# Patient Record
Sex: MALE | Employment: UNEMPLOYED | URBAN - METROPOLITAN AREA
[De-identification: names, ages, dates, MRNs, and addresses within clinical notes are randomized per-mention and may not be internally consistent; named-entity substitution may affect disease eponyms.]

---

## 2021-04-07 ENCOUNTER — OFFICE VISIT (OUTPATIENT)
Dept: NEUROLOGY | Facility: CLINIC | Age: 41
End: 2021-04-07
Payer: MEDICAID

## 2021-04-07 VITALS
DIASTOLIC BLOOD PRESSURE: 79 MMHG | RESPIRATION RATE: 16 BRPM | SYSTOLIC BLOOD PRESSURE: 128 MMHG | OXYGEN SATURATION: 96 % | HEART RATE: 96 BPM

## 2021-04-07 DIAGNOSIS — Z75.8 TELEPHONE LANGUAGE INTERPRETER SERVICE REQUIRED: ICD-10-CM

## 2021-04-07 DIAGNOSIS — H49.40 TOLOSA-HUNT SYNDROME: Primary | ICD-10-CM

## 2021-04-07 DIAGNOSIS — Z22.7 TB LUNG, LATENT: ICD-10-CM

## 2021-04-07 DIAGNOSIS — U07.1 2019 NOVEL CORONAVIRUS DISEASE (COVID-19): ICD-10-CM

## 2021-04-07 DIAGNOSIS — G51.0 LEFT-SIDED BELL'S PALSY: ICD-10-CM

## 2021-04-07 PROCEDURE — 99204 OFFICE O/P NEW MOD 45 MIN: CPT | Mod: GC | Performed by: PSYCHIATRY & NEUROLOGY

## 2021-04-07 RX ORDER — SULFAMETHOXAZOLE AND TRIMETHOPRIM 400; 80 MG/1; MG/1
1 TABLET ORAL
COMMUNITY
Start: 2021-04-03

## 2021-04-07 RX ORDER — PREDNISONE 10 MG/1
TABLET ORAL
COMMUNITY
Start: 2021-04-04 | End: 2021-05-09

## 2021-04-07 RX ORDER — PANTOPRAZOLE SODIUM 40 MG/1
40 TABLET, DELAYED RELEASE ORAL
COMMUNITY
Start: 2021-04-04

## 2021-04-07 RX ORDER — OXYCODONE HYDROCHLORIDE 5 MG/1
5 TABLET ORAL
COMMUNITY
Start: 2021-04-03 | End: 2021-04-19

## 2021-04-07 RX ORDER — ONDANSETRON 4 MG/1
4 TABLET, FILM COATED ORAL
COMMUNITY
Start: 2021-04-03

## 2021-04-07 RX ORDER — PYRIDOXINE HCL (VITAMIN B6) 25 MG
25 TABLET ORAL
COMMUNITY
Start: 2021-04-04

## 2021-04-07 RX ORDER — ACETAMINOPHEN 500 MG
500 TABLET ORAL
COMMUNITY
Start: 2021-04-03

## 2021-04-07 RX ORDER — ISONIAZID 300 MG/1
300 TABLET ORAL
COMMUNITY
Start: 2021-04-04

## 2021-04-07 ASSESSMENT — PAIN SCALES - GENERAL: PAINLEVEL: NO PAIN (0)

## 2021-04-07 NOTE — PROGRESS NOTES
DEPARTMENT OF NEUROLOGY    Patient Name:  Luis Eduardo Carcamo  MRN:  [id number]    :  1980  Date of Clinic Visit:  2021  Primary Care Provider:  No primary care provider on file.        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    Pt [place] at [facility] [place]21 with 1wk HA, night sweats/chills, fatigue, vertigo, nausea, blurred and double vision, and 1d of increased L facial weakness. Fount to be COVID+ and exhibiting CN 3, 4, 6, and 7 palsies on exam. Underwent Brain MRI revealing contrast-enhancement of the L cavernous sinus and superior orbital fissure similar to that seen when originally diagnosed with Aniket-Osullivan in . At that time, he had also presented with similar symptoms, sans the L facial palsy. Was placed on high-dose IV steroids during his 3/20 - 3/24/2021 admission and then discharged on Prednisone 60mg daily, planning to take for 2wks w/ subsequent Neurology follow-up to decide on taper. At time of discharge, was also given Rifampin for his latent TB infection, which had been noted previously but appears to have never been treated.     Things were going well until 3/28 when the pt's symptoms again became severe (had never fully dissipated, only slightly improved during hospitalization) prompting a return to [facility]. On further investigation, was though that pt's worsening symptoms were due to the addition of Rifampin, which acts as an inducer on multiple CYP enzymes and leads to increased plasma clearance/decreased bioavailability of steroids thereby reducing their efficacy by a significant amount. As such, this was NOT felt to be a true relapse, but instead as if steroids had never been given.  "Switched Rifampin to Isoniazid/B6 and again gave high-dose steroids while hospitalized followed by discharge on Prednisone 60mg daily, again to be seen by Neurology who would decide on taper based off clinical improvement. Bactrim also prescribed for PCP prophylaxis    Since discharge from this most recent hospitalization, pt reports that he feels much better. His HA and nausea have completely disappeared - though he continues to take Tylenol and Zofran on a daily basis as he is wary of their return - and the L eye blurry/double vision and L facial weakness have both improved drastically. The pt reports he feels 75-80% back to normal.     Believes he is tolerating his medications well, saying only that he has developed a \"bad taste\" in his mouth, making his tongue feel dirty. As such, he has been chewing gum/brushing his teeth a lot more. Believes this \"bad taste\" may have arisen around the time of his first hospitalization when he was diagnosed w/ COVID. In addition to this, also notes a \"pounding sound\" - that is NOT painful - in his brain when he lies down to sleep. Has tried changing position, but this doesn't change things. Will dissipate if he sits up. Does not keep him from sleeping. Recalls have a similar experience when placed on steroids for his Aniket-Hunt in 2007. Denies HA, nausea/vomiting, vision/hearing changes, brain fog, or confusion    Vital signs:      BP: 128/79 Pulse: 96   Resp: 16 SpO2: 96 %          There is no height or weight on file to calculate BMI.  /79   Pulse 96   Resp 16   SpO2 96%       Examination:     -General: Sitting comfortably on the chair. No acute distress    -HEENT: Normocephalic. PERRL. No skin discolorations noted    -Cardio: RRR without murmurs or bruits     -Pulmonary: Symmetric chest rise, no increased work of breathing on RA    -Abdomen: Positive bowel sounds, soft, non-tender, no organomegaly    -Neurological:     --MS: Patient is alert, attentive, and " oriented. Speech is clear and fluid. Names normally. Registration, recall 3/3 and remote memory intact. Mental math normal.     --CNs: Visual fields are full to confrontation. Pupils are briskly reactive to light. Visual fields full w/ slight retention of double vision w/ extreme L lateral gaze. EOMI w/o further evidence of previously seen CN 3/4/6 palsies. Slight multidirectional nystagmus, facial sensation intact, muscles of mastication normal. Improved but still present complete L facial palsy (pt and wife affirm has chronic L Kwong's palsy at baseline and that his facial weakness is almost back to normal), hearing intact to conversation, palate elevation normal, sternomastoid and trapezius function normal, tongue motions normal     --Motor: Normal muscle tone and bulk. 5/5 muscle strength bilaterally save for 4/5 strength w/ L hip flexion. Still, could raise self to stand w/o use of his arms. Able to stand on toes/heels w/o issue    --Reflexes: 2+ reflexes at knees and biceps and ankles. Down-going toes bilaterally. No clonus    --Sensory: Light touch, vibration and PP intact bilaterally in upper and lower extremities. Neg pronator drift     --Coordination: Heel-shin and finger-nose-finger is intact. Neg Romberg.     --Gait: Stands with feet normally spaced. Gait is steady. Intact tandem walk      INVESTIGATIONS:  All available and relevant labs, imaging, and other procedures were reviewed with the patient at this visit. Those of particular significance are listed below:    MRI Brain + Orbits (3/31/2021)  1. Persistent abnormal enhancing soft tissue thickening of the left cavernous sinus slightly extending into the superior orbital fissure and towards the left cassy clinoid ligament. Additional small focus of dural thickening and enhancement in the vicinity of the left petroclival synchondrosis. Again, likely represents persistent callosal Hunt or other causes of pachymeningitis such as sarcoidosis, or less  likely lymphoma    MRI Brain (3/25/2021)  1.  Recurrent or persistent abnormal contrast enhanced soft tissue thickening in the left cavernous sinus and superior orbital fissure area. This is nonspecific but may again represent Tolossa-Hunt syndrome versus other cause for pachymeningitis, infection, or infiltrative abnormality such as sarcoidosis or lymphoma or other condition.    MRI Brain (3/8/2007)  1. Abnormal mild uniform contrast-enhanced pachymeningeal thickening along the left tentorium, thickest anteriorly and along its free edge, extending into the posterior half of the left cavernous sinus, where there is mild fullness as well. This abnormality dissipates into the anterior cavernous sinus without definitive abnormality of the left superior orbital fissure or along the greater wing of the sphenoid bone. The appearance is nonspecific pachymeningitic inflammation (granulomatous or nongranulomatous) such as might be seen with Aniket-Hunt syndrome (THS), although the relative lack of involvement at the superior orbital fissure region differs from classic imaging examples of THS. Differential might include tuberculous meningitis or sarcoidosis.    MRA head (3/8/2007)  1. Small abnormality at ophthalmic segment of the left internal carotid artery, indeterminate for an ophthalmic artery infundibulum, a small  aneurysm, artifact, or combination of these factors. Head CT angiography is recommended as a noninvasive evaluation of this finding.    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 Pt is reporting a significant improvement in symptoms and appears to be tolerating steroids well. As such, will continue w/ taper as  "originally planned on recent [facility] [place] [place] discharge. Will also repeat MRI Brain and Neuro follow-up in 1 month to ensure resolution of symptoms and imaging findings. At that time, will also obtain MRA as no vessel imaging was taken on recent admission.     Pt also now describing a \"pounding sound\" in his head that is positional in nature. Denies any pain, decline in vision/hearing, N/V, or change in mental status so low concern for elevated intracranial pressure at this time. Could be pt is describing a pulsatile tinnitus, perhaps caused by steroid-provoked HTN, though HTN not seen in today's visit. MRA will help with this as well to ensure there are no vascular malformations that are being missed. Reassured that this same symptom occurred last time he was on steroids for Aniket-Osullivan in 2007 and that it dissipated once off the steroids. As to the \"bad taste\" in his mouth. Difficult to say at this time if this is a medication side-effect or a sequelae of COVID. Will monitor for now    Recommendations:  1. Will have you repeat an MRI and MRA of the Brain in 1 month to monitor that the Aniket-Hunt Syndrome is resolving  2. Continue the Prednisone taper as previously prescribed   - 14d Prednisone 60mg daily   - 7d Prednisone 40mg daily   - 7d Prednisone 20mg daily   - 7d Prednisone 10mg daily   - 7d Prednisone 5mg daily  3. Continue to take Protonix while on steroids  4. You may stop taking the Bactrim antibiotic once you are only taking Prednisone 10mg per day  5. For the next few days, try taking Tylenol and Zofran only if you're having a headache or nausea. Use MyChart or call the clinic and let me know how this goes and we'll decide how often you need to take these medications  6. If your symptoms return, immediately increase the Prednisone to 60mg one per day and call the clinic  7. Follow up with me on 5/19/2021 at 4:00 PM      Patient has been seen with Dr. Cj Apple who agrees with my assessment and " yulisa Flannery MD  [facility] Department of Neurology PGY1  Pager: [phone]]

## 2021-04-07 NOTE — LETTER
2021       RE: Luis Eduardo Carcamo  [address] [address]     Dear Colleague,    Thank you for referring your patient, Luis Eduardo Carcamo, to the [facility] [place] at [facility]. Please see a copy of my visit note below.      DEPARTMENT OF NEUROLOGY    Patient Name:  Luis Eduardo Carcamo  MRN:  [id number]    :  1980  Date of Clinic Visit:  2021  Primary Care Provider:  No primary care provider on file.        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    [place] [facility] [place] with 1wk HA, night sweats/chills, fatigue, vertigo, nausea, blurred and double vision, and 1d of increased L facial weakness. Fount to be COVID+ and exhibiting CN 3, 4, 6, and 7 palsies on exam. Underwent Brain MRI revealing contrast-enhancement of the L cavernous sinus and superior orbital fissure similar to that seen when originally diagnosed with Aniket-Osullivan in . At that time, he had also presented with similar symptoms, sans the L facial palsy. Was placed on high-dose IV steroids during his 3/20 - 3/24/2021 admission and then discharged on Prednisone 60mg daily, planning to take for 2wks w/ subsequent Neurology follow-up to decide on taper. At time of discharge, was also given Rifampin for his latent TB infection, which had been noted previously but appears to have never been treated.     Things were going well until 3/28 when the pt's symptoms again became severe (had never fully dissipated, only slightly improved during hospitalization) prompting a [place] [facility] On further investigation, was though that pt's worsening symptoms were due  "to the addition of Rifampin, which acts as an inducer on multiple CYP enzymes and leads to increased plasma clearance/decreased bioavailability of steroids thereby reducing their efficacy by a significant amount. As such, this was NOT felt to be a true relapse, but instead as if steroids had never been given. Switched Rifampin to Isoniazid/B6 and again gave high-dose steroids while hospitalized followed by discharge on Prednisone 60mg daily, again to be seen by Neurology who would decide on taper based off clinical improvement. Bactrim also prescribed for PCP prophylaxis    Since discharge from this most recent hospitalization, pt reports that he feels much better. His HA and nausea have completely disappeared - though he continues to take Tylenol and Zofran on a daily basis as he is wary of their return - and the L eye blurry/double vision and L facial weakness have both improved drastically. The pt reports he feels 75-80% back to normal.     Believes he is tolerating his medications well, saying only that he has developed a \"bad taste\" in his mouth, making his tongue feel dirty. As such, he has been chewing gum/brushing his teeth a lot more. Believes this \"bad taste\" may have arisen around the time of his first hospitalization when he was diagnosed w/ COVID. In addition to this, also notes a \"pounding sound\" - that is NOT painful - in his brain when he lies down to sleep. Has tried changing position, but this doesn't change things. Will dissipate if he sits up. Does not keep him from sleeping. Recalls have a similar experience when placed on steroids for his Aniket-Hunt in 2007. Denies HA, nausea/vomiting, vision/hearing changes, brain fog, or confusion    Vital signs:      BP: 128/79 Pulse: 96   Resp: 16 SpO2: 96 %          There is no height or weight on file to calculate BMI.  /79   Pulse 96   Resp 16   SpO2 96%       Examination:     -General: Sitting comfortably on the chair. No acute " distress    -HEENT: Normocephalic. PERRL. No skin discolorations noted    -Cardio: RRR without murmurs or bruits     -Pulmonary: Symmetric chest rise, no increased work of breathing on RA    -Abdomen: Positive bowel sounds, soft, non-tender, no organomegaly    -Neurological:     --MS: Patient is alert, attentive, and oriented. Speech is clear and fluid. Names normally. Registration, recall 3/3 and remote memory intact. Mental math normal.     --CNs: Visual fields are full to confrontation. Pupils are briskly reactive to light. Visual fields full w/ slight retention of double vision w/ extreme L lateral gaze. EOMI w/o further evidence of previously seen CN 3/4/6 palsies. Slight multidirectional nystagmus, facial sensation intact, muscles of mastication normal. Improved but still present complete L facial palsy (pt and wife affirm has chronic L Kwong's palsy at baseline and that his facial weakness is almost back to normal), hearing intact to conversation, palate elevation normal, sternomastoid and trapezius function normal, tongue motions normal     --Motor: Normal muscle tone and bulk. 5/5 muscle strength bilaterally save for 4/5 strength w/ L hip flexion. Still, could raise self to stand w/o use of his arms. Able to stand on toes/heels w/o issue    --Reflexes: 2+ reflexes at knees and biceps and ankles. Down-going toes bilaterally. No clonus    --Sensory: Light touch, vibration and PP intact bilaterally in upper and lower extremities. Neg pronator drift     --Coordination: Heel-shin and finger-nose-finger is intact. Neg Romberg.     --Gait: Stands with feet normally spaced. Gait is steady. Intact tandem walk      INVESTIGATIONS:  All available and relevant labs, imaging, and other procedures were reviewed with the patient at this visit. Those of particular significance are listed below:    MRI Brain + Orbits (3/31/2021)  1. Persistent abnormal enhancing soft tissue thickening of the left cavernous sinus slightly  extending into the superior orbital fissure and towards the left cassy clinoid ligament. Additional small focus of dural thickening and enhancement in the vicinity of the left petroclival synchondrosis. Again, likely represents persistent callosal Hunt or other causes of pachymeningitis such as sarcoidosis, or less likely lymphoma    MRI Brain (3/25/2021)  1.  Recurrent or persistent abnormal contrast enhanced soft tissue thickening in the left cavernous sinus and superior orbital fissure area. This is nonspecific but may again represent Tolossa-Hunt syndrome versus other cause for pachymeningitis, infection, or infiltrative abnormality such as sarcoidosis or lymphoma or other condition.    MRI Brain (3/8/2007)  1. Abnormal mild uniform contrast-enhanced pachymeningeal thickening along the left tentorium, thickest anteriorly and along its free edge, extending into the posterior half of the left cavernous sinus, where there is mild fullness as well. This abnormality dissipates into the anterior cavernous sinus without definitive abnormality of the left superior orbital fissure or along the greater wing of the sphenoid bone. The appearance is nonspecific pachymeningitic inflammation (granulomatous or nongranulomatous) such as might be seen with Aniket-Hunt syndrome (THS), although the relative lack of involvement at the superior orbital fissure region differs from classic imaging examples of THS. Differential might include tuberculous meningitis or sarcoidosis.    MRA head (3/8/2007)  1. Small abnormality at ophthalmic segment of the left internal carotid artery, indeterminate for an ophthalmic artery infundibulum, a small  aneurysm, artifact, or combination of these factors. Head CT angiography is recommended as a noninvasive evaluation of this finding.    IMPRESSION/RECOMMENDATIONS:   Luis Eduardo Coley is a 40yr old male w/ PMHx Aniket-Osullivan (dx 2007; steroid responsive), L Kwong's palsy (2011) w/ chronic  "complete L facial droop, latent TB, and recent COVID infection who presents in clinic today on 4/7/2021 for follow-up or a recent Aniket-Hunt/Bell's palsy flare leading to CN 3/4/6/7 palsies. Pt is reporting a significant improvement in symptoms and appears to be tolerating steroids well. As such, will continue w/ taper as originally planned [place] [facility] [place]. Will also repeat MRI Brain and Neuro follow-up in 1 month to ensure resolution of symptoms and imaging findings. At that time, will also obtain MRA as no vessel imaging was taken on recent admission.     Pt also now describing a \"pounding sound\" in his head that is positional in nature. Denies any pain, decline in vision/hearing, N/V, or change in mental status so low concern for elevated intracranial pressure at this time. Could be pt is describing a pulsatile tinnitus, perhaps caused by steroid-provoked HTN, though HTN not seen in today's visit. MRA will help with this as well to ensure there are no vascular malformations that are being missed. Reassured that this same symptom occurred last time he was on steroids for Aniket-Osullivan in 2007 and that it dissipated once off the steroids. As to the \"bad taste\" in his mouth. Difficult to say at this time if this is a medication side-effect or a sequelae of COVID. Will monitor for now    Recommendations:  1. Will have you repeat an MRI and MRA of the Brain in 1 month to monitor that the Aniket-Hunt Syndrome is resolving  2. Continue the Prednisone taper as previously prescribed   - 14d Prednisone 60mg daily   - 7d Prednisone 40mg daily   - 7d Prednisone 20mg daily   - 7d Prednisone 10mg daily   - 7d Prednisone 5mg daily  3. Continue to take Protonix while on steroids  4. You may stop taking the Bactrim antibiotic once you are only taking Prednisone 10mg per day  5. For the next few days, try taking Tylenol and Zofran only if you're having a headache or nausea. Use MyChart or call the clinic and let me know " how this goes and we'll decide how often you need to take these medications  6. If your symptoms return, immediately increase the Prednisone to 60mg one per day and call the clinic  7. Follow up with me on 5/19/2021 at 4:00 PM      Patient has been seen with Dr. Cj Apple who agrees with my assessment and plan    Ora Flannery MD  [facility] Department of Neurology PGY1  Pager: [phone]]      Attestation signed by Cj Apple MD at 4/7/2021 10:10 PM:  Physician Attestation   I, Cj Apple, DO, saw this patient and agree with the findings and plan of care as documented in the note.      40-year-old male with a history of Aniket-Osullivan in 2007, and Bell's palsy in 2011 [place]with suspected repeat episode of Aniket-Osullivan a couple of weeks ago.  He is here for follow-up with us with  but has never been seen in our institution.  I can be records, but not individual images.  Image reports appear consistent with Aniket-Hunt, and CSF was bland.  Also, he reports significant improvement in his symptoms in the last 10 days of starting steroids.  I suspect this is the correct diagnosis.  On exam today he does not have any ophthalmoplegia.  He does have his residual left-sided facial palsy.  His pupils are equal and symmetric.    We will plan on steroid taper as below.  He was instructed that if symptoms were worsening to go back up to 60 mg of prednisone and to call us.  Also instructed that when he gets down to 10 mg dose of prednisone that he can discontinue Bactrim.  We will repeat MRI/MRA in approximately 4 to 6 weeks.  MRA for his pulsatile tinnitus.  He will follow up with Dr. Flannery shortly after MRI.  He should be finishing his prednisone about that time and will assess if he needs a longer course.  Lastly, Dr. Flannery is attempting to get images pushed from [facility] so we can compare our subsequent  imaging.    Items personally reviewed/procedural attestation: vitals, labs, and agree with the interpretation documented in the note.    Cj Apple, DO      Again, thank you for allowing me to participate in the care of your patient.      Sincerely,    Ora Flannery MD

## 2021-04-07 NOTE — LETTER
Date:Bell 10, 2021      Patient was self referred, no letter generated. Do not send.        [facility] Health Information

## 2021-04-07 NOTE — PATIENT INSTRUCTIONS
1. Will have you repeat an MRI and MRA of the brain in 1 month to monitor that the Aniket-Hunt Syndrome is resolving  2. Continue the Prednisone taper as previously prescribed  3. You may stop taking the Bactrim antibiotic once you are only taking Prednisone 10mg per day  4. For the next few days, try taking Tylenol and Zofran only if you're having a headache or nausea. Use MyChart or call the clinic and let me know how this goes and we'll decide how often you need to take these medications  5. If your symptoms return, immediately increase the Prednisone to 60mg one per day and call the clinic  6. Follow up with me on 5/19/2021 at 4:00 PM

## 2021-05-03 ENCOUNTER — ANCILLARY PROCEDURE (OUTPATIENT)
Dept: MRI IMAGING | Facility: CLINIC | Age: 41
End: 2021-05-03
Attending: PSYCHIATRY & NEUROLOGY
Payer: MEDICAID

## 2021-05-03 DIAGNOSIS — H49.40 TOLOSA-HUNT SYNDROME: ICD-10-CM

## 2021-05-03 PROCEDURE — 70544 MR ANGIOGRAPHY HEAD W/O DYE: CPT | Mod: GC | Performed by: RADIOLOGY

## 2021-05-11 ENCOUNTER — TELEPHONE (OUTPATIENT)
Dept: NEUROLOGY | Facility: CLINIC | Age: 41
End: 2021-05-11

## 2021-05-11 DIAGNOSIS — H49.40 TOLOSA-HUNT SYNDROME: Primary | ICD-10-CM

## 2021-05-11 NOTE — TELEPHONE ENCOUNTER
Attempted to call the patient numerous times concerning results of recent Brain MRI and next steps, but unable to get through. Left a voicemail that imaging results were available and to call back when possible. Will also send a letter to the patient at this time detailing his results and recommendations: signs of inflammation from Aniket-Hunt still present on MRI. As long as symptoms have continued to improve/resolved, would recommend a repeat MRI Brain in 6mo for continued monitoring and to ensure no other process is going on    Ora Flannery MD

## 2021-05-18 ENCOUNTER — TELEPHONE (OUTPATIENT)
Dept: NEUROLOGY | Facility: CLINIC | Age: 41
End: 2021-05-18

## 2021-05-18 DIAGNOSIS — H49.40 TOLOSA-HUNT SYNDROME: Primary | ICD-10-CM

## 2021-05-18 NOTE — TELEPHONE ENCOUNTER
"[facility] Call Center    Phone Message    May a detailed message be left on voicemail: yes     Reason for Call: Other: Patients spouse Zelene calling to confirm patients appointment for tomorrow 5/19 with Dr. Flannery for 4:00. Writer does not see appointment scheduled but per notes from last appointment states \"Follow up with me on 5/19/2021 at 4:00 PM\".     Please advise and call Zelene back at your earliest convenience to discuss and confirm appointment.     Action Taken: Other: [facility]    Travel Screening: Not Applicable                                                                      "

## 2021-05-19 RX ORDER — PREDNISONE 10 MG/1
TABLET ORAL
Qty: 35 TABLET | Refills: 0 | Status: SHIPPED | OUTPATIENT
Start: 2021-05-19 | End: 2021-06-17

## 2021-05-19 NOTE — TELEPHONE ENCOUNTER
"PHONE CALL 5/19/2021    Spoke with Mr Luis Eduardo Carcamo and his significant other Patrice Pitt concerning the results of his recent MRI Brain and to discuss how his symptoms have been going. Pt reported that most of his symptoms have dissipated, including the HA's and vertigo. However, he continues to have double vision when looking to the left, though this is improved from initial presentation. Pt also reports that he feels he is \"short-fused\" and gets frustrated more easily.     As pt's continues to have symptoms and signs of inflammation have not disappeared on imaging, will plan for another short burst of steroids followed by a repeat MRI Brain in 6mo. Should symptoms persist and imaging remain abnormal at that time, would begin work-up for other possible etiologies or may consider use of stronger immunosuppressant therapies    Recommendations:  - Prednisone taper as follows:   Prednisone 20mg daily for 7 days, followed by   Prednisone 15mg daily for 7 days, followed by   Prednisone 10mg daily for 7 days, followed by   Prednisone 5mg daily for 7 days then off  - I informed the patient to let me know if symptoms improved, stayed the same, or worsened with this round of steroids  - MRI Brain w/ and w/o contrast in 6mo  - Follow-up visit with me in 6mo, ok for phone visit    Ora Flanneyr MD  Neurology PGY1  "

## 2021-06-14 ENCOUNTER — APPOINTMENT (OUTPATIENT)
Dept: INTERPRETER SERVICES | Facility: CLINIC | Age: 41
End: 2021-06-14

## 2021-06-14 ENCOUNTER — TELEPHONE (OUTPATIENT)
Dept: NEUROLOGY | Facility: CLINIC | Age: 41
End: 2021-06-14

## 2021-06-14 NOTE — TELEPHONE ENCOUNTER
I called pt to check in how he is doing after his last dose of prednisone. Dr. Flannery would like to set up a phone visit with pt in November after his MRI. I offered a visit time on Wednesday 11/10 at 2:30p; spot held. I asked pt call back to confirm appointment time and to give us an update on how he is feeling.     Voicemail left with help of .     Shania NINA

## 2021-06-17 DIAGNOSIS — H49.40 TOLOSA-HUNT SYNDROME: ICD-10-CM

## 2021-06-17 RX ORDER — PREDNISONE 10 MG/1
TABLET ORAL
Qty: 35 TABLET | Refills: 0 | Status: SHIPPED | OUTPATIENT
Start: 2021-06-17 | End: 2021-07-04

## 2021-06-17 NOTE — TELEPHONE ENCOUNTER
[facility] Call Center    Phone Message    May a detailed message be left on voicemail: yes     Reason for Call: Other: Pt calling to confirm video apt for 11/10 at 2:30pm.      Action Taken: Message routed to:  [facility]     Travel Screening: Not Applicable

## 2021-06-24 NOTE — TELEPHONE ENCOUNTER
Left pt a message to sched for November 17 th with  if still available. If not schedule first available.